# Patient Record
Sex: MALE | Race: WHITE | NOT HISPANIC OR LATINO | URBAN - METROPOLITAN AREA
[De-identification: names, ages, dates, MRNs, and addresses within clinical notes are randomized per-mention and may not be internally consistent; named-entity substitution may affect disease eponyms.]

---

## 2018-11-21 ENCOUNTER — OFFICE (OUTPATIENT)
Dept: URBAN - METROPOLITAN AREA CLINIC 101 | Facility: CLINIC | Age: 68
End: 2018-11-21

## 2018-11-21 VITALS
DIASTOLIC BLOOD PRESSURE: 77 MMHG | SYSTOLIC BLOOD PRESSURE: 134 MMHG | HEIGHT: 72 IN | HEART RATE: 58 BPM | TEMPERATURE: 98.8 F | WEIGHT: 190 LBS

## 2018-11-21 DIAGNOSIS — R13.10 DYSPHAGIA, UNSPECIFIED: ICD-10-CM

## 2018-11-21 DIAGNOSIS — K22.8 OTHER SPECIFIED DISEASES OF ESOPHAGUS: ICD-10-CM

## 2018-11-21 DIAGNOSIS — R07.9 CHEST PAIN, UNSPECIFIED: ICD-10-CM

## 2018-11-21 DIAGNOSIS — K22.2 ESOPHAGEAL OBSTRUCTION: ICD-10-CM

## 2018-11-21 DIAGNOSIS — K44.9 DIAPHRAGMATIC HERNIA WITHOUT OBSTRUCTION OR GANGRENE: ICD-10-CM

## 2018-11-21 DIAGNOSIS — K21.9 GASTRO-ESOPHAGEAL REFLUX DISEASE WITHOUT ESOPHAGITIS: ICD-10-CM

## 2018-11-21 DIAGNOSIS — R14.0 ABDOMINAL DISTENSION (GASEOUS): ICD-10-CM

## 2018-11-21 DIAGNOSIS — Z79.899 OTHER LONG TERM (CURRENT) DRUG THERAPY: ICD-10-CM

## 2018-11-21 PROCEDURE — 99205 OFFICE O/P NEW HI 60 MIN: CPT

## 2018-11-21 NOTE — SERVICEHPINOTES
DARIA CRAMER   is a   68   year old    male who is being seen in consultation at the request of   BRE AVALOS   for chest "pressure" and dysphagia that began last year in April 2017.He was initially seen at [facility] for chest pain where he had cardiac catheterization March 2017 and then noticing "heart beating into his throat." He is currently followed by Dr Carrillo who did 24 hr EKG. He was recently seen by PCP on 10/24/18 who ordered barium swallow at [facility] showing: small hiatal hernia, reflux, lower esophageal ring (patent), and presbyesophagus changes No mass. He was started on Omeprazole  40 mg qd x 1 month ago and increased to BID x 2 weeks ago with relief of "throat burning" sensation that would occur with all meals including just water. He reports dysphagia to solids more than liquids, felt over lower sternum area, and improved with PPI. He was started on Metoprolol x 2 months by cardiologist Dr Carrillo that has provided further improvement of chest pressure. He has h/o pylori x 5 years ago diagnosed via blood test and completed treatment, but no f/u test to confirm eradication. He also has occasional "bloating" that improves with "peppermint pills" used 1-2x/month. NSAID use w/ Excedrin 1 ill BID x 6 months. Denies fevers, n/v, abdominal pain, early satiety, decreased appetite, regurgitation, change in bowel habits, blood in stool, melena, weight loss. Last colonoscopy 06/2018 by dr Avalos removed TA polyps recall 5 years. BR

## 2018-11-30 ENCOUNTER — ON CAMPUS - OUTPATIENT (OUTPATIENT)
Dept: URBAN - METROPOLITAN AREA HOSPITAL 35 | Facility: HOSPITAL | Age: 68
End: 2018-11-30
Payer: COMMERCIAL

## 2018-11-30 DIAGNOSIS — K29.60 OTHER GASTRITIS WITHOUT BLEEDING: ICD-10-CM

## 2018-11-30 DIAGNOSIS — R07.9 CHEST PAIN, UNSPECIFIED: ICD-10-CM

## 2018-11-30 DIAGNOSIS — K20.8 OTHER ESOPHAGITIS: ICD-10-CM

## 2018-11-30 DIAGNOSIS — R13.10 DYSPHAGIA, UNSPECIFIED: ICD-10-CM

## 2018-11-30 DIAGNOSIS — K31.7 POLYP OF STOMACH AND DUODENUM: ICD-10-CM

## 2018-11-30 PROCEDURE — 43239 EGD BIOPSY SINGLE/MULTIPLE: CPT

## 2019-03-29 ENCOUNTER — ON CAMPUS - OUTPATIENT (OUTPATIENT)
Dept: URBAN - METROPOLITAN AREA HOSPITAL 35 | Facility: HOSPITAL | Age: 69
End: 2019-03-29
Payer: COMMERCIAL

## 2019-03-29 DIAGNOSIS — K44.9 DIAPHRAGMATIC HERNIA WITHOUT OBSTRUCTION OR GANGRENE: ICD-10-CM

## 2019-03-29 DIAGNOSIS — R07.89 OTHER CHEST PAIN: ICD-10-CM

## 2019-03-29 PROCEDURE — 91034 GASTROESOPHAGEAL REFLUX TEST: CPT | Mod: 26
